# Patient Record
(demographics unavailable — no encounter records)

---

## 2025-01-15 NOTE — HISTORY OF PRESENT ILLNESS
[de-identified] : 35 yo F with hx of left shoulder pain, previous MRI confirmed evidence of calcific tendonitis and posterior labral tear presenting today for shoulder pain follow-up. Received a barbitage in Oct, which provided her with great symptom relief until approximately 2-3 weeks ago when she started to feel a similar pain again not taking any pain medication, tries to avoid medication has continued to do PT, is interested in continuing to do PT for her shoulder does not do exercises at home consistently

## 2025-01-15 NOTE — PHYSICAL EXAM
[de-identified] : Constitutional: Well-nourished, well-developed, No acute distress Respiratory:  Good respiratory effort, no SOB Psychiatric: Pleasant and normal affect, alert and oriented x3 Skin: Clean dry and intact B/L UE Musculoskeletal: normal except where as noted in regional exam     Left Shoulder: APPEARANCE: no marked deformities, no swelling or malalignment POSITIVE TENDERNESS: supraspinatus NONTENDER:  long head biceps tendon, infraspinatus, teres minor. biceps. anterior and posterior capsule. AC joint. ROM: full with mild painful arc past 60 degrees, no scapular winging or dyskinesia present RESISTIVE TESTING: MMT 4+/5 ER, Flexion and Empty can, 5/5 IR. painless 5/5 resisted ext, horizontal abd/add SPECIAL TESTS: + Garibay and Neers, mildly + cross arm adduction, + Speeds, neg Rascon's, neg Drop Arm, neg Apprehension. neg apley's scratch test

## 2025-01-15 NOTE — DISCUSSION/SUMMARY
[de-identified] : Discussed findings of today's exam and possible causes of patient's pain.  Educated patient on their most probable diagnosis of chronic intermittent left shoulder pain with recent atraumatic exacerbation due to underlying calcific rotator cuff tendinitis.  Reviewed possible courses of treatment, and we collaboratively decided best course of treatment at this time will include conservative management.  In October patient underwent barbotage and cortisone injection by interventional radiology, however during that time she suffered a vasovagal episode because of the pain.  She is very hesitant to undergo that procedure again.  We discussed that we can consider proceeding with ultrasound-guided cortisone injections only, avoidance of barbotage should help limit chance of repeat vasovagal episode.  I advised the patient that sometimes a second cortisone injection to be followed by another course of physical therapy may potentially have more long-lasting relief.  If not, patient may potentially benefit from surgical consultation at that time, but currently I think she would benefit from further nonsurgical management.  Patient will consider her treatment options and will let me know if she would like to proceed with injection or if she would like a referral to interventional radiology for barbotage and cortisone injection.  Patient will be started on a course of physical therapy to restore normal range of motion and strength as tolerated.  Follow up as needed.  Patient appreciates and agrees with current plan.  This note was generated using dragon medical dictation software.  A reasonable effort has been made for proofreading its contents, but typos may still remain.  If there are any questions or points of clarification needed please notify my office.

## 2025-02-01 NOTE — PHYSICAL EXAM
[No Acute Distress] : no acute distress [Well Nourished] : well nourished [Well Developed] : well developed [Normal Outer Ear/Nose] : the outer ears and nose were normal in appearance [Normal Oropharynx] : the oropharynx was normal [Normal TMs] : both tympanic membranes were normal [Alert and Oriented x3] : oriented to person, place, and time [Normal Mood] : the mood was normal [de-identified] : nasal turbinates swollen

## 2025-02-01 NOTE — HISTORY OF PRESENT ILLNESS
[FreeTextEntry8] : Pt reports  left ear clogged since having massage. She has nasal congestion pelvic cramps - on Nuvaring. LMP 1/19/25. GYN consult 11/24- wnl  No fever,chills,np UTI sx

## 2025-02-19 NOTE — REVIEW OF SYSTEMS
[Patient Intake Form Reviewed] : Patient intake form was reviewed [Pelvic pain] : pelvic pain [Genital Rash/Irritation] : genital rash/irritation [Negative] : Heme/Lymph

## 2025-02-19 NOTE — PHYSICAL EXAM
[Chaperone Present] : A chaperone was present in the examining room during all aspects of the physical examination [[id number]] : A chaperone was present during the pelvic exam. [FreeTextEntry2] : NILE Dozier [Appropriately responsive] : appropriately responsive [Alert] : alert [No Acute Distress] : no acute distress [Soft] : soft [Non-tender] : non-tender [Non-distended] : non-distended [No HSM] : No HSM [No Lesions] : no lesions [No Mass] : no mass [Oriented x3] : oriented x3 [Labia Majora] : normal [Labia Minora] : normal [Moderate] : There was moderate vaginal bleeding [Normal] : normal [Uterine Adnexae] : normal [FreeTextEntry4] : No lacerations of vaginal mucosa [FreeTextEntry8] : No tenderness on bimanual

## 2025-02-19 NOTE — PHYSICAL EXAM
[Chaperone Present] : A chaperone was present in the examining room during all aspects of the physical examination [[id number]] : A chaperone was present during the pelvic exam. [FreeTextEntry2] : NILE Dozier [Appropriately responsive] : appropriately responsive [Alert] : alert [No Acute Distress] : no acute distress [Soft] : soft [Non-tender] : non-tender [Non-distended] : non-distended [No HSM] : No HSM [No Lesions] : no lesions [No Mass] : no mass [Oriented x3] : oriented x3 [Labia Majora] : normal [Labia Minora] : normal [Moderate] : There was moderate vaginal bleeding [Normal] : normal [Uterine Adnexae] : normal [FreeTextEntry4] : No lacerations of vaginal mucosa [FreeTextEntry8] : No tenderness on bimanual

## 2025-02-19 NOTE — HISTORY OF PRESENT ILLNESS
[FreeTextEntry1] : 35y/o presents with pain LMP current, on NuvaRing for contraception  Pt reports cramping outside of her normal period cramping, reports this lasted for about 3 weeks  1 week ago pt reports she experienced an intense pain when removing her NuvaRing. Also reports pain with intercourse on this same day.  Denies vaginal discharge, dysuria Pt also inquires about fertility in the setting of PCOS and age of 34. Pt is thinking of trying to conceive in about a year, but would like to be engaged or  first

## 2025-02-19 NOTE — PHYSICAL EXAM
[Chaperone Present] : A chaperone was present in the examining room during all aspects of the physical examination [[id number]] : A chaperone was present during the pelvic exam. [FreeTextEntry2] : NILE Dozier [Appropriately responsive] : appropriately responsive [Alert] : alert [No Acute Distress] : no acute distress [Soft] : soft [Non-tender] : non-tender [Non-distended] : non-distended [No HSM] : No HSM [No Lesions] : no lesions [No Mass] : no mass [Oriented x3] : oriented x3 [Labia Majora] : normal [Labia Minora] : normal [Moderate] : There was moderate vaginal bleeding [Normal] : normal [Uterine Adnexae] : normal [FreeTextEntry4] : No lacerations of vaginal mucosa [FreeTextEntry8] : No tenderness on bimanual

## 2025-02-19 NOTE — PLAN
[FreeTextEntry1] : 35y/o presents with 1 week of vaginal pain and abdominal cramping -Vaginitis plus sent today -Requisition for pelvic sono provided   #Fertility/PCOS -Discussed impact of PCOS on fertility - irregular periods make it difficult to predict ovulation window -Advised pt to remain on contraception until she is ready to try and conceive. Once she discontinues contraception, will track periods to see what her pattern is. Briefly discussed ovulation prediction and ovulation induction if needed, as well as inositol and metformin for menstrual regulation in PCOS   RTO PRN princess STURAT

## 2025-02-19 NOTE — PLAN
[FreeTextEntry1] : 35y/o presents with 1 week of vaginal pain and abdominal cramping -Vaginitis plus sent today -Requisition for pelvic sono provided   #Fertility/PCOS -Discussed impact of PCOS on fertility - irregular periods make it difficult to predict ovulation window -Advised pt to remain on contraception until she is ready to try and conceive. Once she discontinues contraception, will track periods to see what her pattern is. Briefly discussed ovulation prediction and ovulation induction if needed, as well as inositol and metformin for menstrual regulation in PCOS   RTO PRN princess STUART

## 2025-03-26 NOTE — HISTORY OF PRESENT ILLNESS
[de-identified] : Reports her left hip pain has been slowly resolving with physical therapy and nsaids. She still has dull groin pain intermittently, worse with exercise and running. She does not feel clicking or instability. She does yoga and stretches. She has developed right groin pain for the past 3 months, and denies specific injury. She has pain with running and squatting. Denies clicking or instability.

## 2025-03-26 NOTE — PHYSICAL EXAM
[Normal] : Gait: normal [de-identified] : General: No acute distress Mental: Alert and oriented x3 Eyes: Conjunctivitis not seen Chest: Symmetric chest rise, no audible wheezing Skin: Bilateral lower extremities absent from rashes and ulcers Abdomen: No distention  Left hip: Skin: Clean, dry and intact Inspection: No obvious deformity, no swelling, no ecchymosis. Tenderness: no tenderness over greater trochanter/gluteus medius insertion. No tenderness pubic symphysis, pubic tubercle, hip flexors. No tenderness ischial tuberosity or buttock. Nontender over the ASIS/Illiac crest. ROM: 0-120. Internal rotation 40, external rotation 70 Special tests: negative Stinchfield, positive FADIR, positive DUGLAS, negative logroll Additional tests: No pain with circumduction, negative impingement test at 90 Strength: 5/5 hip flexion/Abduction/Q/H/TA/GS/EHL Neuro: Sensation intact to light touch throughout in dp/sp/tib/melva/saph distributions Pulses: 2+ DP/PT pulses  Right hip: Skin: Clean, dry and intact Inspection: No obvious deformity, no swelling, no ecchymosis. Tenderness: no tenderness over greater trochanter/gluteus medius insertion. No tenderness pubic symphysis, pubic tubercle, hip flexors. No tenderness ischial tuberosity or buttock. Nontender over the ASIS/Illiac crest. ROM: 0-120. Internal rotation 40, external rotation 70 Special tests: positive Stinchfield, positive FADIR, positive DUGLAS, negative logroll Additional tests: No pain with circumduction, negative impingement test at 90 Strength: 5/5 hip flexion/Abduction/Q/H/TA/GS/EHL Neuro: Sensation intact to light touch throughout in dp/sp/tib/melva/saph distributions Pulses: 2+ DP/PT pulses

## 2025-03-26 NOTE — DISCUSSION/SUMMARY
[de-identified] : Chronic bilateral hip pain with known labral tear of the left hip.  Right hip pain - MRI right hip Start PT tylenol or nsaids as needed Followup after MRI